# Patient Record
Sex: FEMALE | Race: WHITE | NOT HISPANIC OR LATINO | Employment: FULL TIME | URBAN - METROPOLITAN AREA
[De-identification: names, ages, dates, MRNs, and addresses within clinical notes are randomized per-mention and may not be internally consistent; named-entity substitution may affect disease eponyms.]

---

## 2018-06-04 ENCOUNTER — OFFICE VISIT (OUTPATIENT)
Dept: HEMATOLOGY ONCOLOGY | Facility: MEDICAL CENTER | Age: 38
End: 2018-06-04
Payer: MEDICAID

## 2018-06-04 VITALS
HEART RATE: 79 BPM | HEIGHT: 72 IN | TEMPERATURE: 97.3 F | SYSTOLIC BLOOD PRESSURE: 118 MMHG | RESPIRATION RATE: 18 BRPM | DIASTOLIC BLOOD PRESSURE: 70 MMHG | OXYGEN SATURATION: 100 % | BODY MASS INDEX: 23.53 KG/M2 | WEIGHT: 173.72 LBS

## 2018-06-04 DIAGNOSIS — Z80.9 FAMILY HISTORY OF CANCER: ICD-10-CM

## 2018-06-04 PROCEDURE — 99242 OFF/OP CONSLTJ NEW/EST SF 20: CPT | Performed by: MEDICAL GENETICS

## 2018-06-04 RX ORDER — CYCLOBENZAPRINE HCL 10 MG
TABLET ORAL
COMMUNITY
Start: 2018-06-03 | End: 2019-06-27

## 2018-06-04 RX ORDER — SPIRONOLACTONE 50 MG/1
TABLET, FILM COATED ORAL
COMMUNITY
Start: 2018-06-02 | End: 2019-04-16

## 2018-06-04 RX ORDER — CLONAZEPAM 0.5 MG/1
TABLET ORAL
COMMUNITY
Start: 2018-05-23 | End: 2019-04-16

## 2018-06-04 ASSESSMENT — PAIN SCALES - GENERAL: PAINLEVEL: NO PAIN

## 2018-06-04 NOTE — PROGRESS NOTES
Vidya Davies is a 37 y.o. year old patient who was referred for cancer genetic risk assessment     Chief Complaint:   Chief Complaint   Patient presents with   • New Patient     Family History of cancer       HPI: The patient does not carry a diagnosis of cancer, but her mother was diagnosed with breast cancer at age 35, bladder cancer at age 50 and recent diagnosis of pancreatic cancer. A cousin also has a pancreatic cancer diagnosis   Review of personal and family histories suggested that a cancer genetic risk assessment was in order.    Review of Systems (ROS):  Constitutional normal   Eyes normal   ENT normal    Respiratory normal   Cardiovascular normal   Gastrointestinal normal   Genitourinary normal   Musculoskeletal some joint pain recently  Skin normal   Neurological normal   Endocrine normal   Psychiatric normall  Hematologic/lymphatic normal   Allergic/Immunologic no sensitivities to medicateions  Remaining systems negative? Yes  Total review of symptoms  >10:       History (Past/Family/ROS)  Family History:    3 generation pedigree built  Yes   Moraima empiric risk calculation No   Adam II empiric risk calculation  No  Social History     Yes    Social History     Social History   • Marital status: Single     Spouse name: N/A   • Number of children: N/A   • Years of education: N/A     Occupational History   • Not on file.     Social History Main Topics   • Smoking status: Not on file   • Smokeless tobacco: Not on file   • Alcohol use Not on file   • Drug use: Unknown   • Sexual activity: Not on file     Other Topics Concern   • Not on file     Social History Narrative   • No narrative on file       Patient Past History     Yes  History areas assessef  3:   NCCN Testing Criteria Met                            No    Physical Exam  Constitutional    Encounter Vitals  Standard Vitals  Vitals  Blood Pressure: 118/70  Temperature: 36.3 °C (97.3 °F)  Pulse: 79  Respiration: 18  Pulse Oximetry: 100  %  Height: 182.9 cm (6')  Weight: 78.8 kg (173 lb 11.6 oz)  Encounter Vitals  Temperature: 36.3 °C (97.3 °F)  Temp Source: Temporal  Blood Pressure: 118/70  BP Location: Upper Arm, Right  Patient BP Position: Sitting  Pulse: 79  Respiration: 18  Pulse Oximetry: 100 %  Weight: 78.8 kg (173 lb 11.6 oz)  Weight Source: Stand Up Scale  Height: 182.9 cm (6')  BMI (Calculated): 23.56  Pain Score: No pain  Pregnant?: No  Breastfeeding Status: No  Pulmonary-Specific Vitals     Durable Medical Equipment-Specific Vitals              General appearance: normal   Head Circumference:   ([place])  Eyes    Conjunctiva: clear   Pupils: reactive to light and accomodation     ENMT    External inspection: nroaml   Assessment of Hearing:  Normal    Lips/cheeks/gums: no lesions  Neck   External exam: normal    Thyroid: no masses  Respiratory   Auscultation: clear in all fields      Cardiovascular   Auscultation: RRR with no  murmers   Edema : none  Chest   Breasts (exam): not done   Palpation:   GI   External exam and palpation: normal      Pelvic (female): not done   External exam (male):   Lymphatic   Palpation in 2 areas: normal     Musculoskeletal   Gait: normal    Digits and Nails:  No lesons  Skin   Inspection: normal    Palpation: no masses                  Fibrofolliculoma:No                  Trichodiscoma:No                   Akrochordon: No                   CAfe-au-lait: No                  Hypopigmentation: No                  Mucosal freckling No  Neurologic   Cranial nerves: intact   DTR’s: 1+ and equal  PE summary    18 bullets (of 25 total):     Problem Points   New, further evaluation planned (4)    Moderate  risk    DATA POINTS    >4:     PROBLEM POINTS  >4:     RISK  Intermediate    TYPE OF MEDICAL DECISION MAKING Intermediate      30 minutes TIME (FACE TO FACE) AND DOCUMENTED  DOCUMENTATION DESCRIBE CONTENTS OF COUNSELING/CARE COORDINATION  DOCUMENTATION SUPPORT >50% TIME SPENT IN COUNSELING/COORDINATION         20059(30 minutes)  with Moderate complexity    Patient with family (maternal ) of early breast cancer, bladder cancer and pancreatic cancer.  The patient's mother was just panel tested and results are pending.  Will await maternal results before initiating testing on this patient    30 minutes (10:10-10:40)

## 2019-04-02 ENCOUNTER — APPOINTMENT (OUTPATIENT)
Dept: RADIOLOGY | Facility: MEDICAL CENTER | Age: 39
End: 2019-04-02
Payer: MEDICAID

## 2019-04-02 ENCOUNTER — HOSPITAL ENCOUNTER (EMERGENCY)
Facility: MEDICAL CENTER | Age: 39
End: 2019-04-02
Attending: EMERGENCY MEDICINE
Payer: MEDICAID

## 2019-04-02 ENCOUNTER — APPOINTMENT (OUTPATIENT)
Dept: RADIOLOGY | Facility: MEDICAL CENTER | Age: 39
End: 2019-04-02
Attending: EMERGENCY MEDICINE
Payer: MEDICAID

## 2019-04-02 VITALS
HEART RATE: 67 BPM | RESPIRATION RATE: 29 BRPM | WEIGHT: 180.56 LBS | HEIGHT: 72 IN | SYSTOLIC BLOOD PRESSURE: 120 MMHG | TEMPERATURE: 98.3 F | DIASTOLIC BLOOD PRESSURE: 80 MMHG | BODY MASS INDEX: 24.46 KG/M2 | OXYGEN SATURATION: 98 %

## 2019-04-02 DIAGNOSIS — E05.90 HYPERTHYROIDISM: ICD-10-CM

## 2019-04-02 DIAGNOSIS — I49.3 VENTRICULAR ECTOPY: ICD-10-CM

## 2019-04-02 DIAGNOSIS — R07.89 ATYPICAL CHEST PAIN: ICD-10-CM

## 2019-04-02 LAB
ALBUMIN SERPL BCP-MCNC: 4.4 G/DL (ref 3.2–4.9)
ALBUMIN/GLOB SERPL: 1.9 G/DL
ALP SERPL-CCNC: 50 U/L (ref 30–99)
ALT SERPL-CCNC: 19 U/L (ref 2–50)
ANION GAP SERPL CALC-SCNC: 7 MMOL/L (ref 0–11.9)
AST SERPL-CCNC: 16 U/L (ref 12–45)
BASOPHILS # BLD AUTO: 0.5 % (ref 0–1.8)
BASOPHILS # BLD: 0.03 K/UL (ref 0–0.12)
BILIRUB SERPL-MCNC: 0.4 MG/DL (ref 0.1–1.5)
BNP SERPL-MCNC: 25 PG/ML (ref 0–100)
BUN SERPL-MCNC: 10 MG/DL (ref 8–22)
CALCIUM SERPL-MCNC: 9.4 MG/DL (ref 8.5–10.5)
CHLORIDE SERPL-SCNC: 107 MMOL/L (ref 96–112)
CO2 SERPL-SCNC: 26 MMOL/L (ref 20–33)
CREAT SERPL-MCNC: 0.7 MG/DL (ref 0.5–1.4)
EKG IMPRESSION: NORMAL
EOSINOPHIL # BLD AUTO: 0.04 K/UL (ref 0–0.51)
EOSINOPHIL NFR BLD: 0.7 % (ref 0–6.9)
ERYTHROCYTE [DISTWIDTH] IN BLOOD BY AUTOMATED COUNT: 41.7 FL (ref 35.9–50)
GLOBULIN SER CALC-MCNC: 2.3 G/DL (ref 1.9–3.5)
GLUCOSE SERPL-MCNC: 100 MG/DL (ref 65–99)
HCT VFR BLD AUTO: 42.7 % (ref 37–47)
HGB BLD-MCNC: 14.6 G/DL (ref 12–16)
IMM GRANULOCYTES # BLD AUTO: 0.01 K/UL (ref 0–0.11)
IMM GRANULOCYTES NFR BLD AUTO: 0.2 % (ref 0–0.9)
LYMPHOCYTES # BLD AUTO: 1.65 K/UL (ref 1–4.8)
LYMPHOCYTES NFR BLD: 27.3 % (ref 22–41)
MCH RBC QN AUTO: 31.5 PG (ref 27–33)
MCHC RBC AUTO-ENTMCNC: 34.2 G/DL (ref 33.6–35)
MCV RBC AUTO: 92.2 FL (ref 81.4–97.8)
MONOCYTES # BLD AUTO: 0.44 K/UL (ref 0–0.85)
MONOCYTES NFR BLD AUTO: 7.3 % (ref 0–13.4)
NEUTROPHILS # BLD AUTO: 3.87 K/UL (ref 2–7.15)
NEUTROPHILS NFR BLD: 64 % (ref 44–72)
NRBC # BLD AUTO: 0 K/UL
NRBC BLD-RTO: 0 /100 WBC
PLATELET # BLD AUTO: 210 K/UL (ref 164–446)
PMV BLD AUTO: 9.6 FL (ref 9–12.9)
POTASSIUM SERPL-SCNC: 3.5 MMOL/L (ref 3.6–5.5)
PROT SERPL-MCNC: 6.7 G/DL (ref 6–8.2)
RBC # BLD AUTO: 4.63 M/UL (ref 4.2–5.4)
SODIUM SERPL-SCNC: 140 MMOL/L (ref 135–145)
T4 FREE SERPL-MCNC: 1.6 NG/DL (ref 0.53–1.43)
TROPONIN I SERPL-MCNC: <0.01 NG/ML (ref 0–0.04)
TSH SERPL DL<=0.005 MIU/L-ACNC: 0.01 UIU/ML (ref 0.38–5.33)
WBC # BLD AUTO: 6 K/UL (ref 4.8–10.8)

## 2019-04-02 PROCEDURE — 93005 ELECTROCARDIOGRAM TRACING: CPT | Performed by: EMERGENCY MEDICINE

## 2019-04-02 PROCEDURE — 83880 ASSAY OF NATRIURETIC PEPTIDE: CPT

## 2019-04-02 PROCEDURE — 80053 COMPREHEN METABOLIC PANEL: CPT

## 2019-04-02 PROCEDURE — 85025 COMPLETE CBC W/AUTO DIFF WBC: CPT

## 2019-04-02 PROCEDURE — 84443 ASSAY THYROID STIM HORMONE: CPT

## 2019-04-02 PROCEDURE — 36415 COLL VENOUS BLD VENIPUNCTURE: CPT

## 2019-04-02 PROCEDURE — 93005 ELECTROCARDIOGRAM TRACING: CPT

## 2019-04-02 PROCEDURE — 84484 ASSAY OF TROPONIN QUANT: CPT

## 2019-04-02 PROCEDURE — 84439 ASSAY OF FREE THYROXINE: CPT

## 2019-04-02 PROCEDURE — 71045 X-RAY EXAM CHEST 1 VIEW: CPT

## 2019-04-02 PROCEDURE — 99285 EMERGENCY DEPT VISIT HI MDM: CPT

## 2019-04-02 RX ORDER — PROPRANOLOL HYDROCHLORIDE 20 MG/1
20 TABLET ORAL 3 TIMES DAILY
Qty: 30 TAB | Refills: 0 | Status: SHIPPED | OUTPATIENT
Start: 2019-04-02 | End: 2019-04-16 | Stop reason: SDUPTHER

## 2019-04-02 RX ORDER — PROPRANOLOL HYDROCHLORIDE 10 MG/1
10 TABLET ORAL 3 TIMES DAILY
Qty: 30 TAB | Refills: 0 | Status: SHIPPED | OUTPATIENT
Start: 2019-04-02 | End: 2019-04-02

## 2019-04-02 NOTE — ED PROVIDER NOTES
ED Provider Note    CHIEF COMPLAINT  Chief Complaint   Patient presents with   • Chest Pain   • Sent from Urgent Care       HPI  Vidya Davies is a 38 y.o. female who presents for evaluation of chest pain and tachycardia.  Patient states that since February she has been having intermittent episodes of chest pain.  She describes transient sharp pains in the chest area lasting 1-2 seconds at most.  She states this is gone on and off for the last couple of months.  She related this to starting prednisone for back pain with radiculopathy.  Last night, the patient states that her apple watch indicated that her heart was beating fast with a heart rate of 130.  The patient was seen at the urgent care and sent to the ED for evaluation.  The patient denies: Fever, nasal congestion, purulent rhinorrhea, cough, sputum, hemoptysis, vomiting, hematemesis, melena hematochezia, pain or swelling in lower extremities, unilateral motor weakness or paresthesias, syncope.  She has had some lightheadedness.  The patient indicates she has been under quite a bit of stress over the last year as her mother  last  of pancreatic cancer.  She states her brother, who is her , is an alcoholic and has not been out of bed for 2 months to be able to sister in taking care of the restaurant.  No other acute symptomatology or complaints.    REVIEW OF SYSTEMS  See HPI for further details.  She denies a history of: Hypertension, diabetes, thyroid dysfunction, seizures, dyslipidemia, cardiopulmonary disorders, gastrointestinal disorders, genitourinary disorders.  All other systems negative.    PAST MEDICAL HISTORY  1.  Anxiety    FAMILY HISTORY  No family history on file.    SOCIAL HISTORY  Non-smoker;    SURGICAL HISTORY  No past surgical history on file.    CURRENT MEDICATIONS  See nurse's notes    ALLERGIES  No Known Allergies    PHYSICAL EXAM  VITAL SIGNS: /80   Pulse 93   Temp 36.8 °C (98.3 °F) (Temporal)    "Resp 16   Ht 1.905 m (6' 3\")   Wt 81.9 kg (180 lb 8.9 oz)   SpO2 100%   BMI 22.57 kg/m²    Constitutional: 38-year-old female, well developed, Well nourished, No acute distress, Non-toxic appearance.   HENT: ,Atraumatic, Bilateral external ears normal, tympanic membranes clear, Oropharynx moist, No oral exudates, Nose normal.   Eyes: PERRL, EOMI, Conjunctiva normal, No discharge.   Neck: Normal range of motion, No tenderness, Supple, No stridor.  No appreciable thyromegaly;  Lymphatic: No lymphadenopathy noted.   Cardiovascular: Normal heart rate, Normal rhythm, No murmurs, No rubs, No gallops.   Thorax & Lungs: Normal Equal breath sounds, No respiratory distress, No wheezing, no stridor, no rales. No chest tenderness.   Abdomen: Soft, nontender, nondistended, no organomegaly, positive bowel sounds normal in quality. No guarding or rebound.  Skin: Good skin turgor, pink, warm, dry. No rashes, petechiae, purpura. Normal capillary refill.   Back: No tenderness, No CVA tenderness.   Extremities: Intact distal pulses, No edema, No tenderness, No cyanosis, No clubbing. Vascular: Pulses are 2+, symmetric in the upper and lower extremities.  Musculoskeletal: Good range of motion in all major joints. No tenderness to palpation or major deformities noted.   Neurologic: Alert & oriented x 3, Normal motor function, Normal sensory function, No gross focal deficits noted.   Psychiatric: Affect normal, Judgment normal, Mood normal.     EKG  I have interpreted: Rate 95, rhythm sinus, axis normal, occasional PVC, twelve-lead EKG, no old tracing for comparison;    RADIOLOGY/PROCEDURES  DX-CHEST-PORTABLE (1 VIEW)   Final Result      No acute cardiac or pulmonary abnormality is noted.            COURSE & MEDICAL DECISION MAKING  Pertinent Labs & Imaging studies reviewed. (See chart for details)  1.  Saline lock  2.  Monitor    Laboratory studies: CBC and differential within normal; CMP shows potassium 3.5 otherwise within normal; " troponin less than 0.01; BNP 25; TSH low at 0.010; T4 elevated at 1.60;    Discussion: At this time, patient presents for evaluation of palpitations.  Patient has ventricular ectopy but no evidence of couplets or ventricular tachycardia.  There is no syncope or near syncope associated with these findings.  The workup did reveal evidence of hyperthyroidism which definitely may be causing the ventricular ectopy.  The patient will be placed on low-dose propranolol and given referral back to primary care for consideration of endocrinology evaluation and treatment of hypothyroidism.  In addition, the patient was given follow-up with cardiology for evaluation and consideration of ventricular ectopy and possible Holter monitoring to ensure no serious dysrhythmias.  Based on the findings, the patient is stable for discharge.  I discussed the findings treatment plan with the patient.  She indicates she is comfortable with this explanation disposition.    FINAL IMPRESSION  1. Ventricular ectopy    2. Hyperthyroidism    3. Atypical chest pain           PLAN  1.  Follow-up with primary care  2.  Propranolol 20 mg 3 times daily  3.  Follow-up with primary care  4.  Follow-up with endocrinology  5.  Follow-up with cardiology    Electronically signed by: Guy G Gansert, 4/2/2019 11:55 AM

## 2019-04-02 NOTE — ED TRIAGE NOTES
Pt c/o rapid HR. Pt seen at urgent care and told to come to ED. Pt wears apple watch and had  and 130 at night.

## 2019-04-02 NOTE — ED NOTES
Patient given d/c instructions and prescription, verbalized understanding. AAOx4, VSS, d/c'd home.

## 2019-04-16 ENCOUNTER — OFFICE VISIT (OUTPATIENT)
Dept: CARDIOLOGY | Facility: MEDICAL CENTER | Age: 39
End: 2019-04-16
Payer: MEDICAID

## 2019-04-16 VITALS
HEART RATE: 88 BPM | HEIGHT: 72 IN | BODY MASS INDEX: 24.38 KG/M2 | OXYGEN SATURATION: 100 % | SYSTOLIC BLOOD PRESSURE: 130 MMHG | DIASTOLIC BLOOD PRESSURE: 70 MMHG | WEIGHT: 180 LBS

## 2019-04-16 DIAGNOSIS — E05.90 HYPERTHYROIDISM: Chronic | ICD-10-CM

## 2019-04-16 DIAGNOSIS — I49.3 MULTIFOCAL PVCS: Chronic | ICD-10-CM

## 2019-04-16 PROCEDURE — 99204 OFFICE O/P NEW MOD 45 MIN: CPT | Performed by: INTERNAL MEDICINE

## 2019-04-16 RX ORDER — BUSPIRONE HYDROCHLORIDE 15 MG/1
TABLET ORAL
Refills: 5 | COMMUNITY
Start: 2019-03-19 | End: 2019-06-27

## 2019-04-16 RX ORDER — HYDROCODONE BITARTRATE AND ACETAMINOPHEN 10; 325 MG/1; MG/1
TABLET ORAL
Refills: 0 | COMMUNITY
Start: 2019-02-15 | End: 2019-04-16

## 2019-04-16 RX ORDER — PROPRANOLOL HYDROCHLORIDE 10 MG/1
TABLET ORAL
Refills: 0 | COMMUNITY
Start: 2019-04-02 | End: 2019-04-16

## 2019-04-16 RX ORDER — PROPRANOLOL HYDROCHLORIDE 20 MG/1
20 TABLET ORAL 3 TIMES DAILY
Qty: 90 TAB | Refills: 3 | Status: SHIPPED | OUTPATIENT
Start: 2019-04-16 | End: 2019-08-05 | Stop reason: SDUPTHER

## 2019-04-16 RX ORDER — CLONAZEPAM 1 MG/1
TABLET ORAL
Refills: 2 | COMMUNITY
Start: 2019-04-03 | End: 2019-06-27

## 2019-04-16 RX ORDER — CETIRIZINE HYDROCHLORIDE 10 MG/1
TABLET ORAL
Refills: 9 | COMMUNITY
Start: 2019-04-01 | End: 2019-06-27

## 2019-04-16 RX ORDER — DOXEPIN HYDROCHLORIDE 10 MG/1
CAPSULE ORAL
Refills: 3 | COMMUNITY
Start: 2019-03-22 | End: 2019-06-27

## 2019-04-16 RX ORDER — PREDNISONE 10 MG/1
TABLET ORAL
Refills: 0 | COMMUNITY
Start: 2019-02-21 | End: 2019-04-16

## 2019-04-16 RX ORDER — BENZONATATE 100 MG/1
CAPSULE ORAL
Refills: 0 | COMMUNITY
Start: 2019-03-12 | End: 2019-04-16

## 2019-04-16 ASSESSMENT — ENCOUNTER SYMPTOMS
EYE REDNESS: 1
BACK PAIN: 1
MEMORY LOSS: 1
PALPITATIONS: 1
INSOMNIA: 1
HEADACHES: 1
CHILLS: 1
NECK PAIN: 1
DIZZINESS: 1
NERVOUS/ANXIOUS: 1

## 2019-04-16 NOTE — LETTER
[facility]   [address], [address]  [place] [address]  Phone: [phone]  Fax: [phone]              Vidya Davies  1980    Encounter Date: 4/16/2019    Kenny Moore M.D.          PROGRESS NOTE:  Chief Complaint   Patient presents with   • Chest Pain     hospital follow up       Subjective:   Vidya Davies is a 38 y.o. female who presents today for a recent ER visit for palpitations where she found to have multifocal PVCs and concern for hyperthyroidism.  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     Social History Main Topics   • Smoking status: Never Smoker   • Smokeless tobacco: Never Used   • Alcohol use No   • Drug use: No   • Sexual activity: Not on file     Other Topics Concern   • Not on file     Social History Narrative   • No narrative on file     No Known Allergies  Outpatient Encounter Prescriptions as of 4/16/2019   Medication Sig Dispense Refill   • busPIRone (BUSPAR) 15 MG tablet TK 1 T PO BID  5   • cetirizine (ZYRTEC) 10 MG Tab TK 1 T PO QD  9   • clonazePAM (KLONOPIN) 1 MG Tab TK 1 T PO QD  2   • doxepin (SINEQUAN) 10 MG Cap TK 1 C PO QHS  3   • propranolol (INDERAL) 20 MG Tab Take 1 Tab by mouth 3 times a day. 90 Tab 3   • cyclobenzaprine (FLEXERIL) 10 MG Tab      • [DISCONTINUED] benzonatate (TESSALON) 100 MG  Cap TK 1 C PO TID PRF COUGH  0   • [DISCONTINUED] HYDROcodone/acetaminophen (NORCO)  MG Tab TK 1 T PO Q 4 TO 6 H PRN P  0   • [DISCONTINUED] predniSONE (DELTASONE) 10 MG Tab TK 1 T PO QD  0   • [DISCONTINUED] propranolol (INDERAL) 10 MG Tab TK 1 T PO TID  0   • [DISCONTINUED] propranolol (INDERAL) 20 MG Tab Take 1 Tab by mouth 3 times a day. (Patient not taking: Reported on 4/16/2019) 30 Tab 0   • [DISCONTINUED] spironolactone (ALDACTONE) 50 MG Tab      • [DISCONTINUED] clonazePAM (KLONOPIN) 0.5 MG Tab      • [DISCONTINUED] alprazolam (XANAX) 0.25 MG TABS Take 0.25 mg by mouth at bedtime as needed for Sleep.     • [DISCONTINUED] busPIRone (BUSPAR) 5 MG TABS Take 5 mg by mouth 3 times a day.     • [DISCONTINUED] hydrocodone-acetaminophen (NORCO) 5-325 MG TABS per tablet Take 1-2 Tabs by mouth every four hours as needed. (Patient not taking: Reported on 4/16/2019) 15 Tab 0   • [DISCONTINUED] ibuprofen (MOTRIN) 800 MG TABS Take 1 Tab by mouth every 8 hours as needed. (Patient not taking: Reported on 4/16/2019) 30 Tab 0     No facility-administered encounter medications on file as of 4/16/2019.      Review of Systems   Constitutional: Positive for chills and malaise/fatigue.   Eyes: Positive for redness.   Cardiovascular: Positive for chest pain and palpitations.   Musculoskeletal: Positive for back pain, joint pain and neck pain.   Neurological: Positive for dizziness and headaches.   Endo/Heme/Allergies: Positive for environmental allergies.   Psychiatric/Behavioral: Positive for memory loss. The patient is nervous/anxious and has insomnia.         Objective:   /70 (BP Location: Left arm, Patient Position: Sitting)   Pulse 88   Ht 1.829 m (6')   Wt 81.6 kg (180 lb)   SpO2 100%   BMI 24.41 kg/m²      Physical Exam   Constitutional: No distress.   HENT:   Mouth/Throat: Oropharynx is clear and moist. No oropharyngeal exudate.   Eyes: No scleral icterus.   Neck: No JVD present.   Cardiovascular: Normal  rate and normal heart sounds.  Exam reveals no gallop and no friction rub.    No murmur heard.  Pulmonary/Chest: No respiratory distress. She has no wheezes. She has no rales.   Abdominal: Soft. Bowel sounds are normal.   Musculoskeletal: She exhibits no edema.   Neurological: She is alert.   Skin: No rash noted. She is not diaphoretic.   Psychiatric: She has a normal mood and affect.     We reviewed in person the recent labs  Recent Results (from the past 4032 hour(s))   EKG (NOW)    Collection Time: 19 10:26 AM   Result Value Ref Range    Report       [facility]    Test Date:  2019  Pt Name:    PENNY EPPERSON            Department: ER  MRN:        [id number]                      Room:  Gender:     Female                       Technician: [id number]  :        1980                   Requested By:ER TRIAGE PROTOCOL  Order #:    [id number]                    Reading MD:    Measurements  Intervals                                Axis  Rate:       93                           P:          74  NE:         164                          QRS:        67  QRSD:       100                          T:          -2  QT:         380  QTc:        473    Interpretive Statements  SINUS TACHYCARDIA  MULTIFORM VENTRICULAR PREMATURE COMPLEXES  LOW VOLTAGE IN FRONTAL LEADS  BORDERLINE T ABNORMALITIES, INFERIOR LEADS  No previous ECG available for comparison     CBC with Differential    Collection Time: 19 12:15 PM   Result Value Ref Range    WBC 6.0 4.8 - 10.8 K/uL    RBC 4.63 4.20 - 5.40 M/uL    Hemoglobin 14.6 12.0 - 16.0 g/dL    Hematocrit 42.7 37.0 - 47.0 %    MCV 92.2 81.4 - 97.8 fL    MCH 31.5 27.0 - 33.0 pg    MCHC 34.2 33.6 - 35.0 g/dL    RDW 41.7 35.9 - 50.0 fL    Platelet Count 210 164 - 446 K/uL    MPV 9.6 9.0 - 12.9 fL    Neutrophils-Polys 64.00 44.00 - 72.00 %    Lymphocytes 27.30 22.00 - 41.00 %    Monocytes 7.30 0.00 - 13.40 %    Eosinophils 0.70 0.00 - 6.90 %     Basophils 0.50 0.00 - 1.80 %    Immature Granulocytes 0.20 0.00 - 0.90 %    Nucleated RBC 0.00 /100 WBC    Neutrophils (Absolute) 3.87 2.00 - 7.15 K/uL    Lymphs (Absolute) 1.65 1.00 - 4.80 K/uL    Monos (Absolute) 0.44 0.00 - 0.85 K/uL    Eos (Absolute) 0.04 0.00 - 0.51 K/uL    Baso (Absolute) 0.03 0.00 - 0.12 K/uL    Immature Granulocytes (abs) 0.01 0.00 - 0.11 K/uL    NRBC (Absolute) 0.00 K/uL   Complete Metabolic Panel (CMP)    Collection Time: 04/02/19 12:15 PM   Result Value Ref Range    Sodium 140 135 - 145 mmol/L    Potassium 3.5 (L) 3.6 - 5.5 mmol/L    Chloride 107 96 - 112 mmol/L    Co2 26 20 - 33 mmol/L    Anion Gap 7.0 0.0 - 11.9    Glucose 100 (H) 65 - 99 mg/dL    Bun 10 8 - 22 mg/dL    Creatinine 0.70 0.50 - 1.40 mg/dL    Calcium 9.4 8.5 - 10.5 mg/dL    AST(SGOT) 16 12 - 45 U/L    ALT(SGPT) 19 2 - 50 U/L    Alkaline Phosphatase 50 30 - 99 U/L    Total Bilirubin 0.4 0.1 - 1.5 mg/dL    Albumin 4.4 3.2 - 4.9 g/dL    Total Protein 6.7 6.0 - 8.2 g/dL    Globulin 2.3 1.9 - 3.5 g/dL    A-G Ratio 1.9 g/dL   Troponin    Collection Time: 04/02/19 12:15 PM   Result Value Ref Range    Troponin I <0.01 0.00 - 0.04 ng/mL   TSH (for screening thyroid dysfunction)    Collection Time: 04/02/19 12:15 PM   Result Value Ref Range    TSH 0.010 (L) 0.380 - 5.330 uIU/mL   Free Thyroxine (add to TSH for patients with existing thyroid dysfunction)    Collection Time: 04/02/19 12:15 PM   Result Value Ref Range    Free T-4 1.60 (H) 0.53 - 1.43 ng/dL   BTYPE NATRIURETIC PEPTIDE    Collection Time: 04/02/19 12:15 PM   Result Value Ref Range    B Natriuretic Peptide 25 0 - 100 pg/mL   ESTIMATED GFR    Collection Time: 04/02/19 12:15 PM   Result Value Ref Range    GFR If African American >60 >60 mL/min/1.73 m 2    GFR If Non African American >60 >60 mL/min/1.73 m 2       Assessment:     1. Multifocal PVCs  Holter Monitor Study    EC-ECHOCARDIOGRAM COMPLETE W/O CONT    TSH    THYROXINE    FREE THYROXINE   2. Hyperthyroidism  TSH     THYROXINE    FREE THYROXINE       Medical Decision Making:  Today's Assessment / Status / Plan:     It was my pleasure to meet with Ms. Davies.    We will get a Holter monitor to understand her overall burden and echocardiogram to rule out structural heart disease and reevaluation of her thyroid studies    I will see Ms. Davies back in 6 months time and encouraged her to follow up with us over the phone or e-mail using my MyChart as issues arise.    It is my pleasure to participate in the care of Ms. Davies.  Please do not hesitate to contact me with questions or concerns.    Kenny Moore MD PhD [facility]  Cardiologist [facility]    Please note that this dictation was created using voice recognition software. I have worked with consultants from the vendor as well as technical experts from [facility] to optimize the interface. I have made every reasonable attempt to correct obvious errors, but I expect that there are errors of grammar and possibly content I did not discover before finalizing the note.         Yeny Burrell M.D.  [address] [address]  VIA Facsimile: [phone]

## 2019-04-17 NOTE — PROGRESS NOTES
Chief Complaint   Patient presents with   • Chest Pain     hospital follow up       Subjective:   Vidya Davies is a 38 y.o. female who presents today for a recent ER visit for palpitations where she found to have multifocal PVCs and concern for hyperthyroidism.  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     Social History Main Topics   • Smoking status: Never Smoker   • Smokeless tobacco: Never Used   • Alcohol use No   • Drug use: No   • Sexual activity: Not on file     Other Topics Concern   • Not on file     Social History Narrative   • No narrative on file     No Known Allergies  Outpatient Encounter Prescriptions as of 4/16/2019   Medication Sig Dispense Refill   • busPIRone (BUSPAR) 15 MG tablet TK 1 T PO BID  5   • cetirizine (ZYRTEC) 10 MG Tab TK 1 T PO QD  9   • clonazePAM (KLONOPIN) 1 MG Tab TK 1 T PO QD  2   • doxepin (SINEQUAN) 10 MG Cap TK 1 C PO QHS  3   • propranolol (INDERAL) 20 MG Tab Take 1 Tab by mouth 3 times a day. 90 Tab 3   • cyclobenzaprine (FLEXERIL) 10 MG Tab      • [DISCONTINUED] benzonatate (TESSALON) 100 MG Cap TK 1 C PO TID PRF COUGH  0   • [DISCONTINUED] HYDROcodone/acetaminophen (NORCO)  MG Tab TK 1 T PO Q 4 TO 6 H PRN P  0   • [DISCONTINUED] predniSONE (DELTASONE) 10 MG Tab TK 1 T PO QD  0   • [DISCONTINUED] propranolol (INDERAL) 10 MG Tab TK 1 T PO TID  0   •  [DISCONTINUED] propranolol (INDERAL) 20 MG Tab Take 1 Tab by mouth 3 times a day. (Patient not taking: Reported on 4/16/2019) 30 Tab 0   • [DISCONTINUED] spironolactone (ALDACTONE) 50 MG Tab      • [DISCONTINUED] clonazePAM (KLONOPIN) 0.5 MG Tab      • [DISCONTINUED] alprazolam (XANAX) 0.25 MG TABS Take 0.25 mg by mouth at bedtime as needed for Sleep.     • [DISCONTINUED] busPIRone (BUSPAR) 5 MG TABS Take 5 mg by mouth 3 times a day.     • [DISCONTINUED] hydrocodone-acetaminophen (NORCO) 5-325 MG TABS per tablet Take 1-2 Tabs by mouth every four hours as needed. (Patient not taking: Reported on 4/16/2019) 15 Tab 0   • [DISCONTINUED] ibuprofen (MOTRIN) 800 MG TABS Take 1 Tab by mouth every 8 hours as needed. (Patient not taking: Reported on 4/16/2019) 30 Tab 0     No facility-administered encounter medications on file as of 4/16/2019.      Review of Systems   Constitutional: Positive for chills and malaise/fatigue.   Eyes: Positive for redness.   Cardiovascular: Positive for chest pain and palpitations.   Musculoskeletal: Positive for back pain, joint pain and neck pain.   Neurological: Positive for dizziness and headaches.   Endo/Heme/Allergies: Positive for environmental allergies.   Psychiatric/Behavioral: Positive for memory loss. The patient is nervous/anxious and has insomnia.         Objective:   /70 (BP Location: Left arm, Patient Position: Sitting)   Pulse 88   Ht 1.829 m (6')   Wt 81.6 kg (180 lb)   SpO2 100%   BMI 24.41 kg/m²     Physical Exam   Constitutional: No distress.   HENT:   Mouth/Throat: Oropharynx is clear and moist. No oropharyngeal exudate.   Eyes: No scleral icterus.   Neck: No JVD present.   Cardiovascular: Normal rate and normal heart sounds.  Exam reveals no gallop and no friction rub.    No murmur heard.  Pulmonary/Chest: No respiratory distress. She has no wheezes. She has no rales.   Abdominal: Soft. Bowel sounds are normal.   Musculoskeletal: She exhibits no edema.    Neurological: She is alert.   Skin: No rash noted. She is not diaphoretic.   Psychiatric: She has a normal mood and affect.     We reviewed in person the recent labs  Recent Results (from the past 4032 hour(s))   EKG (NOW)    Collection Time: 19 10:26 AM   Result Value Ref Range    Report       [facility] Emergency Dept.    Test Date:  2019  Pt Name:    PENNY EPPERSON            Department: ER  MRN:        [id number]                      Room:  Gender:     Female                       Technician: [id number]  :        1980                   Requested By:ER TRIAGE PROTOCOL  Order #:    [id number]                    Reading MD:    Measurements  Intervals                                Axis  Rate:       93                           P:          74  AR:         164                          QRS:        67  QRSD:       100                          T:          -2  QT:         380  QTc:        473    Interpretive Statements  SINUS TACHYCARDIA  MULTIFORM VENTRICULAR PREMATURE COMPLEXES  LOW VOLTAGE IN FRONTAL LEADS  BORDERLINE T ABNORMALITIES, INFERIOR LEADS  No previous ECG available for comparison     CBC with Differential    Collection Time: 19 12:15 PM   Result Value Ref Range    WBC 6.0 4.8 - 10.8 K/uL    RBC 4.63 4.20 - 5.40 M/uL    Hemoglobin 14.6 12.0 - 16.0 g/dL    Hematocrit 42.7 37.0 - 47.0 %    MCV 92.2 81.4 - 97.8 fL    MCH 31.5 27.0 - 33.0 pg    MCHC 34.2 33.6 - 35.0 g/dL    RDW 41.7 35.9 - 50.0 fL    Platelet Count 210 164 - 446 K/uL    MPV 9.6 9.0 - 12.9 fL    Neutrophils-Polys 64.00 44.00 - 72.00 %    Lymphocytes 27.30 22.00 - 41.00 %    Monocytes 7.30 0.00 - 13.40 %    Eosinophils 0.70 0.00 - 6.90 %    Basophils 0.50 0.00 - 1.80 %    Immature Granulocytes 0.20 0.00 - 0.90 %    Nucleated RBC 0.00 /100 WBC    Neutrophils (Absolute) 3.87 2.00 - 7.15 K/uL    Lymphs (Absolute) 1.65 1.00 - 4.80 K/uL    Monos (Absolute) 0.44 0.00 - 0.85 K/uL    Eos (Absolute) 0.04 0.00 -  0.51 K/uL    Baso (Absolute) 0.03 0.00 - 0.12 K/uL    Immature Granulocytes (abs) 0.01 0.00 - 0.11 K/uL    NRBC (Absolute) 0.00 K/uL   Complete Metabolic Panel (CMP)    Collection Time: 04/02/19 12:15 PM   Result Value Ref Range    Sodium 140 135 - 145 mmol/L    Potassium 3.5 (L) 3.6 - 5.5 mmol/L    Chloride 107 96 - 112 mmol/L    Co2 26 20 - 33 mmol/L    Anion Gap 7.0 0.0 - 11.9    Glucose 100 (H) 65 - 99 mg/dL    Bun 10 8 - 22 mg/dL    Creatinine 0.70 0.50 - 1.40 mg/dL    Calcium 9.4 8.5 - 10.5 mg/dL    AST(SGOT) 16 12 - 45 U/L    ALT(SGPT) 19 2 - 50 U/L    Alkaline Phosphatase 50 30 - 99 U/L    Total Bilirubin 0.4 0.1 - 1.5 mg/dL    Albumin 4.4 3.2 - 4.9 g/dL    Total Protein 6.7 6.0 - 8.2 g/dL    Globulin 2.3 1.9 - 3.5 g/dL    A-G Ratio 1.9 g/dL   Troponin    Collection Time: 04/02/19 12:15 PM   Result Value Ref Range    Troponin I <0.01 0.00 - 0.04 ng/mL   TSH (for screening thyroid dysfunction)    Collection Time: 04/02/19 12:15 PM   Result Value Ref Range    TSH 0.010 (L) 0.380 - 5.330 uIU/mL   Free Thyroxine (add to TSH for patients with existing thyroid dysfunction)    Collection Time: 04/02/19 12:15 PM   Result Value Ref Range    Free T-4 1.60 (H) 0.53 - 1.43 ng/dL   BTYPE NATRIURETIC PEPTIDE    Collection Time: 04/02/19 12:15 PM   Result Value Ref Range    B Natriuretic Peptide 25 0 - 100 pg/mL   ESTIMATED GFR    Collection Time: 04/02/19 12:15 PM   Result Value Ref Range    GFR If African American >60 >60 mL/min/1.73 m 2    GFR If Non African American >60 >60 mL/min/1.73 m 2       Assessment:     1. Multifocal PVCs  Holter Monitor Study    EC-ECHOCARDIOGRAM COMPLETE W/O CONT    TSH    THYROXINE    FREE THYROXINE   2. Hyperthyroidism  TSH    THYROXINE    FREE THYROXINE       Medical Decision Making:  Today's Assessment / Status / Plan:     It was my pleasure to meet with Ms. Davies.    We will get a Holter monitor to understand her overall burden and echocardiogram to rule out structural heart disease  and reevaluation of her thyroid studies    I will see Ms. Davies back in 6 months time and encouraged her to follow up with us over the phone or e-mail using my MyChart as issues arise.    It is my pleasure to participate in the care of Ms. Davies.  Please do not hesitate to contact me with questions or concerns.    Kenny Moore MD PhD [facility]  Cardiologist [facility]    Please note that this dictation was created using voice recognition software. I have worked with consultants from the vendor as well as technical experts from [facility] to optimize the interface. I have made every reasonable attempt to correct obvious errors, but I expect that there are errors of grammar and possibly content I did not discover before finalizing the note.

## 2019-04-26 ENCOUNTER — HOSPITAL ENCOUNTER (OUTPATIENT)
Dept: LAB | Facility: MEDICAL CENTER | Age: 39
End: 2019-04-26
Attending: INTERNAL MEDICINE
Payer: MEDICAID

## 2019-04-26 DIAGNOSIS — I49.3 MULTIFOCAL PVCS: Chronic | ICD-10-CM

## 2019-04-26 DIAGNOSIS — E05.90 HYPERTHYROIDISM: Chronic | ICD-10-CM

## 2019-05-03 ENCOUNTER — NON-PROVIDER VISIT (OUTPATIENT)
Dept: CARDIOLOGY | Facility: MEDICAL CENTER | Age: 39
End: 2019-05-03
Payer: MEDICAID

## 2019-05-03 DIAGNOSIS — I49.3 MULTIFOCAL PVCS: Chronic | ICD-10-CM

## 2019-05-06 ENCOUNTER — HOSPITAL ENCOUNTER (OUTPATIENT)
Dept: CARDIOLOGY | Facility: MEDICAL CENTER | Age: 39
End: 2019-05-06
Attending: INTERNAL MEDICINE
Payer: MEDICAID

## 2019-05-06 ENCOUNTER — TELEPHONE (OUTPATIENT)
Dept: CARDIOLOGY | Facility: MEDICAL CENTER | Age: 39
End: 2019-05-06

## 2019-05-06 DIAGNOSIS — I49.3 MULTIFOCAL PVCS: Chronic | ICD-10-CM

## 2019-05-06 LAB
EKG IMPRESSION: NORMAL
LV EJECT FRACT  99904: 60
LV EJECT FRACT MOD 4C 99902: 56.43

## 2019-05-06 PROCEDURE — 93306 TTE W/DOPPLER COMPLETE: CPT

## 2019-05-06 PROCEDURE — 93224 XTRNL ECG REC UP TO 48 HRS: CPT | Performed by: INTERNAL MEDICINE

## 2019-05-06 PROCEDURE — 93306 TTE W/DOPPLER COMPLETE: CPT | Mod: 26 | Performed by: INTERNAL MEDICINE

## 2019-05-07 ENCOUNTER — TELEPHONE (OUTPATIENT)
Dept: CARDIOLOGY | Facility: MEDICAL CENTER | Age: 39
End: 2019-05-07

## 2019-05-07 NOTE — TELEPHONE ENCOUNTER
Returned patient call and reviewed MD recommendations she verbalizes understanding.  Reviewed MD recommendations on next FV and offered patient to be transferred to schedulers.  She accepts.  She states no other concerns or questions at this time and is appreciative of information given.  Call successfully transferred to schedulers.    ----------------------  Holter Monitor Study   Order: [id number]   Status:  Final result   Visible to patient:  Yes (Portia)  Dx:  Multifocal PVCs   Notes recorded by Kenny Moore M.D. on 5/6/2019 at 7:43 PM PDT    Her monitor does show some arrhythmias nothing alarming but she may know this episode of fast heart rhythms in a row certainly let us know if she has any presyncope or syncope and she would benefit for yearly follow-up at least    Thank you     EC-ECHOCARDIOGRAM COMPLETE W/O CONT   Order: [id number]   Status:  Final result   Visible to patient:  Yes (Portia)  Dx:  Multifocal PVCs   Notes recorded by Kenny Moore M.D. on 5/6/2019 at 7:36 PM PDT    The echo looks good, she does have borderline prolapse but no significant issues will need long-term follow-up perhaps every 5 years, please let her know     Thank you

## 2019-05-07 NOTE — TELEPHONE ENCOUNTER
TSH   Order: [id number]   Status:  Edited Result - FINAL   Visible to patient:  Yes (MyChart)   Notes recorded by Kenny Moore M.D. on 4/29/2019 at 2:44 PM PDT    This is quite high needs to see PMD or endo ASAP    Thank you     Returned patient call and reviewed MD recommendations.  She verbalizes understanding and states she turned in the Holter Monitor this morning and completed the Echo today.  Reassurance given once results are reviewed by MD, recommendations will be made, and patient will be contacted.  She states no other concerns or questions at this time and is appreciative of information given.

## 2019-06-03 ENCOUNTER — OFFICE VISIT (OUTPATIENT)
Dept: INTERNAL MEDICINE | Facility: MEDICAL CENTER | Age: 39
End: 2019-06-03
Payer: MEDICAID

## 2019-06-03 ENCOUNTER — PATIENT MESSAGE (OUTPATIENT)
Dept: INTERNAL MEDICINE | Facility: MEDICAL CENTER | Age: 39
End: 2019-06-03

## 2019-06-03 VITALS
SYSTOLIC BLOOD PRESSURE: 128 MMHG | DIASTOLIC BLOOD PRESSURE: 84 MMHG | OXYGEN SATURATION: 94 % | HEART RATE: 73 BPM | BODY MASS INDEX: 24.76 KG/M2 | HEIGHT: 72 IN | WEIGHT: 182.8 LBS | TEMPERATURE: 98.4 F

## 2019-06-03 DIAGNOSIS — E05.90 HYPERTHYROIDISM: Chronic | ICD-10-CM

## 2019-06-03 DIAGNOSIS — R53.83 FATIGUE, UNSPECIFIED TYPE: ICD-10-CM

## 2019-06-03 DIAGNOSIS — F41.9 ANXIETY: ICD-10-CM

## 2019-06-03 DIAGNOSIS — E87.6 HYPOKALEMIA: ICD-10-CM

## 2019-06-03 DIAGNOSIS — I49.3 MULTIFOCAL PVCS: Chronic | ICD-10-CM

## 2019-06-03 DIAGNOSIS — Z00.00 HEALTHCARE MAINTENANCE: ICD-10-CM

## 2019-06-03 PROBLEM — E28.2 PCOS (POLYCYSTIC OVARIAN SYNDROME): Status: ACTIVE | Noted: 2019-06-03

## 2019-06-03 PROCEDURE — 99204 OFFICE O/P NEW MOD 45 MIN: CPT | Mod: GC | Performed by: INTERNAL MEDICINE

## 2019-06-03 RX ORDER — VALACYCLOVIR HYDROCHLORIDE 1 G/1
TABLET, FILM COATED ORAL
Refills: 1 | COMMUNITY
Start: 2019-04-29 | End: 2019-06-27

## 2019-06-03 RX ORDER — METHIMAZOLE 5 MG/1
5 TABLET ORAL 3 TIMES DAILY
COMMUNITY
End: 2019-06-04 | Stop reason: SDUPTHER

## 2019-06-03 RX ORDER — MULTIVITAMIN WITH IRON
TABLET ORAL
COMMUNITY
End: 2019-06-28

## 2019-06-03 RX ORDER — CHOLECALCIFEROL (VITAMIN D3) 125 MCG
500 CAPSULE ORAL DAILY
COMMUNITY
End: 2019-06-27

## 2019-06-03 RX ORDER — MULTIVIT WITH MINERALS/LUTEIN
TABLET ORAL
COMMUNITY
End: 2019-06-03

## 2019-06-03 ASSESSMENT — PATIENT HEALTH QUESTIONNAIRE - PHQ9: CLINICAL INTERPRETATION OF PHQ2 SCORE: 0

## 2019-06-03 NOTE — PROGRESS NOTES
New Patient to Establish    Reason to establish: New patient to establish    CC: establish care, hyperthyroidism, anxiety    HPI: 38F with PMHx recently diagnosed hyperthyroidism here to establish care.  Patient previously followed with Dr. Yeny Chaves, but patient requesting if his PCP for better patient/physician relationship.    Hypothyroidism: Patient recently diagnosed with hyperthyroidism.  2019 labs: TSH 0.010, free T4 1.60.  Patient's previous PCP prescribed methimazole 5 mg 3 times daily.  Also refer patient to endocrinology.  Patient has endocrinology appointment on 2019 with Dr. Mancuso.  Presently patient reports increased fatigue over the past several months.      Anxiety, increased stress: She states that she has been undergoing a lot of stress over the past year.  She reports her mother  of pancreatic cancer in 2018.  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  Patient reports that her PCP started her on clonazepam to help her with her nighttime restlessness and difficulty sleeping.  Denies melena/hematochezia, SOB.  Now reports she sleeps well.     Multifocal PVCs: Patient reports that she reported to the ED about 2 months ago because of palpitations.  Since then she has had a Holter monitor and an echocardiogram.  Holter monitor showed multifocal PVCs.  She followed up with Dr. Moore, cardiology, for this.  She was instructed to immediately call cardiology if she begins to have symptoms of presyncope or syncopal episodes.  She is to follow-up with cardiology yearly for her multifocal PVCs.  Her echocardiogram showed borderline prolapse.  Per cardiology, Dr. Moore, patient is to follow-up with them every 5 years for this.  Patient initially started on propranolol 10 mg 3 times daily for her multifocal PVCs by cardiology.  She continues to feel her palpitations  and so her propranolol was increased to 20 mg 3 times daily by cardiology.  Now reports only minimally feeling palpitations.  Denies episodes of presyncope or syncope, chest pain, SOB, abd pain.        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     • VITAMIN A PO Take  by mouth.     • Magnesium 250 MG Tab Take  by mouth.     • VITAMIN E PO Take  by mouth.     • Ascorbic Acid (VITAMIN C) 1000 MG Tab Take  by mouth.     • cyanocobalamin (VITAMIN B-12) 500 MCG Tab Take 500 mcg by mouth every day.     • busPIRone (BUSPAR) 15 MG tablet TK 1 T PO BID  5   • cetirizine (ZYRTEC) 10 MG Tab TK 1 T PO QD  9   • clonazePAM (KLONOPIN) 1 MG Tab TK 1 T PO QD  2   • doxepin (SINEQUAN) 10 MG Cap TK 1 C PO QHS  3   • propranolol (INDERAL) 20 MG Tab Take 1 Tab by mouth 3 times a day. 90 Tab 3   • cyclobenzaprine (FLEXERIL) 10 MG Tab      • valacyclovir (VALTREX) 1 GM Tab TK 1 T PO  BID FOR 10 DAYS  1     No current facility-administered medications for this visit.        Allergies as of 06/03/2019   • (No Known Allergies)       Social History     Social History   • Marital status: Single     Spouse name: N/A   • Number of children: N/A   • Years of education: N/A     Occupational History   • Not on file.     Social History Main Topics   • Smoking status: Never Smoker   • Smokeless tobacco: Never Used   • Alcohol use No   • Drug use: No   • Sexual activity: Not on file     Other Topics Concern   • Not on file     Social History Narrative   • No narrative on file       Family History   Problem Relation Age of Onset   • Heart Disease Father         pacemaker, afib   • Heart  Disease Son         likely PFO   • Cancer Mother         bereast bladder and pancrease       Past Surgical History:   Procedure Laterality Date   • APPENDECTOMY CHILD         ROS: As per HPI. All others reviewed and were negative.        /84 (BP Location: Right arm, Patient Position: Sitting)   Pulse 73   Temp 36.9 °C (98.4 °F) (Temporal)   Ht 1.829 m (6')   Wt 82.9 kg (182 lb 12.8 oz)   SpO2 94%   BMI 24.79 kg/m²      Physical Exam  General: .  Alert and oriented, No apparent distress.    Eyes: Pupils equal and reactive. No scleral icterus.    Throat: Clear no erythema or exudates noted.    Neck: Supple. No lymphadenopathy noted. Thyroid not enlarged.    Lungs: Clear to auscultation bilaterally.  No crackles/wheezing.     Cardiovascular: Regular rate and rhythm. No murmurs, rubs or gallops.    Abdomen:  Benign. No rebound or guarding noted.    Extremities: No clubbing, cyanosis, edema.    Skin: Fair skin.  Clear. No rash or suspicious skin lesions noted. Multiple moles, freckles on back.  Multiple tattoos.      Psych: Appropriate mood/affect.     Note: I have reviewed all pertinent labs and diagnostic tests associated with this visit with specific comments listed under the assessment and plan below    Assessment and Plan    1. Multifocal PVCs  - Recent Holter monitor showed multifocal PVCs.  She followed up with Dr. Moore, cardiology.  Instructed to immediately call cardiology if she begins to have symptoms of presyncope or syncopal episodes.  She is to follow-up with cardiology yearly for her multifocal PVCs.    - Recent echocardiogram showed borderline prolapse.  Per cardiology, Dr. Moore, patient is to follow-up with Cardiology every 5 years for this.    - Patient initially started on propranolol 10 mg 3 times daily by Cardiology and recently increased to 20 mg three times daily for her multifocal PVCs.  She reports she has felt improved symptoms and no worsening fatigue since starting  this.  She    2. Hyperthyroidism  - April 2019 labs: TSH 0.010, free T4 1.60.  Continue methimazole 5 mg 3 times daily.    - Patient has an Endocrinology appointment on 6/13/2019 with Dr. Mancuso.    - Repeat TSH/free T4 labs this week prior to appointment.      3. Anxiety  4. Difficulty with sleep  5. Fatigue, unspecified type  - Anxiety has been improving.  No present difficulty sleeping on clonazepam prescribed by previous PCP.  Instructed patient to decrease Clonazepam by half for the purposes of tapering it as it may be contributing to her fatigue.  -H&H labs in April 2019 unremarkable.    - REFERRAL TO PSYCHOLOGY  - REFERRAL TO PSYCHIATRY  - [facility] SED RATE; Future  - CRP HIGH SENSITIVE (CARDIAC); Future  - SIENNA REFLEXIVE PROFILE; Future  - RHEUMATOID ARTHRITIS FACTOR; Future    6. Hypokalemia  -4/2/2019 lab-potassium 3.5.  No signs/symptoms of hypokalemia.  -Repeat BMP.    - Basic Metabolic Panel; Future    7. Healthcare maintenance  -Family history of skin cancer.  Patient with fair skin.  Will refer to dermatology for mole screening.  - REFERRAL TO DERMATOLOGY  - Will need to obtain records from previous PCP.        Followup: Return in about 5 weeks (around 7/8/2019) for Long - follow up - multiple concerns. .    Signed by: Camila Perdomo M.D.

## 2019-06-04 NOTE — TELEPHONE ENCOUNTER
From: Vidya Davies  To: Camila Perdomo M.D.  Sent: 6/3/2019 7:04 PM PDT  Subject: Procedure Question    Hi. I got my labs done today but the people at [facility] said none of the blood draws where for the thyroid. And Today when we met you mentioned having my thyroid tested. So I just wanted to check. Thank you.

## 2019-06-27 ENCOUNTER — OFFICE VISIT (OUTPATIENT)
Dept: INTERNAL MEDICINE | Facility: MEDICAL CENTER | Age: 39
End: 2019-06-27
Payer: MEDICAID

## 2019-06-27 VITALS
OXYGEN SATURATION: 99 % | HEIGHT: 71 IN | TEMPERATURE: 97.8 F | DIASTOLIC BLOOD PRESSURE: 69 MMHG | HEART RATE: 69 BPM | SYSTOLIC BLOOD PRESSURE: 110 MMHG | WEIGHT: 181.4 LBS | BODY MASS INDEX: 25.4 KG/M2

## 2019-06-27 DIAGNOSIS — E04.9 GOITER: ICD-10-CM

## 2019-06-27 DIAGNOSIS — E05.00 GRAVES' DISEASE: ICD-10-CM

## 2019-06-27 DIAGNOSIS — R41.840 POOR CONCENTRATION: ICD-10-CM

## 2019-06-27 DIAGNOSIS — G47.00 INSOMNIA, UNSPECIFIED TYPE: ICD-10-CM

## 2019-06-27 DIAGNOSIS — F41.9 ANXIETY: ICD-10-CM

## 2019-06-27 DIAGNOSIS — E05.90 HYPERTHYROIDISM: Chronic | ICD-10-CM

## 2019-06-27 PROCEDURE — 99213 OFFICE O/P EST LOW 20 MIN: CPT | Mod: GE | Performed by: INTERNAL MEDICINE

## 2019-06-27 RX ORDER — TRAZODONE HYDROCHLORIDE 50 MG/1
50 TABLET ORAL
Qty: 30 TAB | Refills: 2 | Status: SHIPPED | OUTPATIENT
Start: 2019-06-27

## 2019-06-27 RX ORDER — THIAMINE MONONITRATE (VIT B1) 100 MG
100 TABLET ORAL DAILY
COMMUNITY
End: 2019-11-11

## 2019-06-27 RX ORDER — LORATADINE 10 MG/1
10 TABLET ORAL DAILY
COMMUNITY

## 2019-06-27 ASSESSMENT — PAIN SCALES - GENERAL: PAINLEVEL: 4=SLIGHT-MODERATE PAIN

## 2019-06-27 NOTE — PATIENT INSTRUCTIONS
Please start the new medication (Trazodone).   Please follow-up with your PCP (Camila Perdomo M.D.) in about 1 month.    Please follow-up with scheduling for Both Psychology and Psychiatry:  [facility]  Phone:[phone]

## 2019-06-27 NOTE — PROGRESS NOTES
"Established Patient (of office / [facility])  (PCP - Camila Perdomo M.D.)    Chief Complaint   Patient presents with   • Thyroid Problem     medications, anxiety/sleep     HPI:  Vidya Davies is a 38 y.o. female here for follow-up.     Hyperthyroidism  States she was initially diagnosed with thyroid condition back in March.  She is also had problems with PVCs/heart rate, due to this issue.  She has been seen by cardiology, with Holter monitor, and had her propranolol increased to 20, 3 times daily.    She has also previously been referred to endocrinology.  She has since followed up with Dr. Mancuso.  He had noted a mild goiter, and diagnosed Graves' disease/hyperthyroidism.  She had already been on methimazole, from her prior PCP, and notes continued.      Anxiety, insomnia, and mental-fog  However, patient notes that she still continues to be anxious, and also has trouble sleeping.  She feels these are separate issues.  She states that she has trouble sleeping, but it is not from feeling anxious (at that time).  She also notes that during the day, she feels sometimes confused (or has trouble finding right words); but feels it is unrelated to trouble sleeping, or being tired.  She has not noticed any difference in this, from the morning to the evening.    She notes that she was previously on clonazepam, but this was tapered off by her new PCP.  She had also been on doxepin, but felt that it stopped working; therefore, the patient stopped taking it, on her own.  She also notes that she was previously (a while back) given buspirone, that she thought may have had some effect; but was uncertain because she was on Xanax, as well, at that time.  And, she notes that she had previously been given some \"depression\" medications, that she does not recall the name of, but did not react well to.  She does note that Paxil was 1 of these medications (but does not recall the names of the others).  She states that when she " used Paxil before, she had a worsening of her anxiety.      MARIE-7 scale (Generalized Anxiety Disorder)   1.  Feeling nervous, anxious, or on edge           3  2.  Not being able to stop or control worrying     1  3.  Worrying too much about different things     2  4.  Trouble relaxing                                             3  5.  Being so restless that it's hard to sit still       1  6.  Becoming easily annoyed or irritable            2  7.  Feeling afraid as if something awful might happen     0  If you checked off any problems, how difficult have these made it for you to do your work, take care of things at home, or get along with other people?   Not difficult at all __________   Somewhat difficult ___X______   Very difficult _____________   Extremely difficult _________   Total Score =   12      Review of Symptoms  GEN/CONST:   Denies fever, fatigue, weakness, or significant weight change.   H/E:     Denies  eye pain.  + occasional blurry vision (will f/u with opthalmology)  ENT:   Denies sinus pain, sore throat, ear pain, difficulty hearing, or tinnitus.  CARDIO:   Denies  palpitations, orthopnea, or edema.  + occasional chest pain, (will f/u with cardiology)  RESP:   Denies shortness of breath, wheezing, or coughing.  GI:    Denies nausea, vomiting, diarrhea, constipation, or abdominal pain.   :   Denies dysuria, hematuria, hesitancy, or urgency.  HEME:  Denies easy bruising, bleeding, or problem with clots.   ALLG:  Denies allergies, asthma, or hives.    MSK:  Denies weakness, edema, joint pains or swellings, or muscle aches.   SKIN:  Denies rashes, itches, or sores.   NEURO:  Denies numbness or tingling, altered sensation, or focal weakness.    ENDO:  Denies cold intolerance, polyuria, or polydipsia.  + often feels hot.   PSYCH:  Denies anxiety, depression, substance abuse, or insomnia.       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     Social History Main Topics   • Smoking status: Never Smoker   • Smokeless tobacco: Never Used   • Alcohol use 0.6 oz/week     1 Cans of beer per week      Comment: Social    • Drug use: No   • Sexual activity: Not on file     Other Topics Concern   • Not on file     Social History Narrative   • No narrative on file       Current Outpatient Prescriptions   Medication Sig Dispense Refill   • loratadine (CLARITIN) 10 MG Tab Take 10 mg by mouth every day.     • thiamine (THIAMINE) 100 MG Tab Take 100 mg by mouth every day.     • methimazole (TAPAZOLE) 5 MG Tab Take 1 Tab by mouth 3 times a day. 90 Tab 0   • VITAMIN A PO Take  by mouth.     • Magnesium 250 MG Tab Take  by mouth.     • VITAMIN E PO Take  by mouth.     • propranolol (INDERAL) 20 MG Tab Take 1 Tab by mouth 3 times a day. 90 Tab 3     No current facility-administered medications for this visit.        Allergies   Allergen Reactions   • Paroxetine      Anxiety increased.          Physical Exam  /69 (BP Location: Left arm, Patient Position: Sitting, BP Cuff Size: Adult)   Pulse 69   Temp 36.6 °C (97.8 °F) (Temporal)   Ht 1.81 m (5' 11.26\")   Wt 82.3 kg (181 lb 6.4 oz)   SpO2 99%   BMI 25.12 kg/m²   General:  Alert and oriented, No apparent distress.  HEENT:   PERRLA, EOMI, No icterus, No erythema or exudates.    Lungs: Clear to auscultation bilaterally.  No wheezes or rales.  Cardiovascular: Regular rate and rhythm.  No murmurs, rubs or gallops.  Abdomen:  Soft.  Non-distended.  Non-tender.  " "  Normal bowel sounds.  No rebound or guarding noted.   Extremities: No pedal edema.  Good general motion of all 4 extremities.   Neurological:  Motor function and sensation grossly intact and symmetrical.   Psychological: Appears to have normal mood and affect.      Labs:  Brings in outside labs (will scan into media).   TSH-0.28, T3-96, T4-1.2  (Improved, from prior labs).      Assessment & Plan    1. Insomnia   2. Anxiety  3. Poor concentration  Patient describes these as separate conditions, but may be interrelated.  She has previously tried and failed clonazepam, doxepin, buspirone, and Paxil, as well as other \"depression\" medications.  Will try trazodone, to help with insomnia, as it can also be helpful with anxiety.  Hopefully, if these 2 issues are improved, her concentration may improve.  Patient is also previously been referred to psychiatry and psychology.  However, she has not followed up, as she was uncertain of the number to schedule at.  This was reprinted out for her, and given her in the \"wrap-up\" instructions.  - traZODone (DESYREL) 50 MG Tab; Take 1 Tab by mouth every bedtime.  Dispense: 30 Tab; Refill: 2    4. Hyperthyroidism  5. Graves' disease  6. Goiter  Patient is being followed by endocrinology, and is currently on methimazole.  She also follows with cardiology (and is on propanolol), for past heart rate concerns and PVCs.  We will allow the specialists to further manage those issues.      Patient should follow-up with her PCP (Camila Perdomo M.D.) in a few weeks, to monitor the condition of her anxiety/insomnia, and consider medication changes, if needed, while waiting for psychiatry (as well as for further, routine follow-up).      Instructions given to the patient:  Please start the new medication (Trazodone).   Please follow-up with your PCP (Camila Perdomo M.D.) in about 1 month.    Please follow-up with scheduling for Both Psychology and Psychiatry:  [facility] " [facility]  Phone:[phone]      A computerized dictation system may have been used for this note.    Despite review, there may be some spelling or grammatical errors.    Pawan Cuadra M.D.  6/27/2019   Attending:  Kimmy Lawrence M.D.

## 2019-06-28 ENCOUNTER — OFFICE VISIT (OUTPATIENT)
Dept: CARDIOLOGY | Facility: MEDICAL CENTER | Age: 39
End: 2019-06-28
Payer: MEDICAID

## 2019-06-28 VITALS
WEIGHT: 182 LBS | SYSTOLIC BLOOD PRESSURE: 112 MMHG | HEIGHT: 72 IN | HEART RATE: 66 BPM | BODY MASS INDEX: 24.65 KG/M2 | OXYGEN SATURATION: 100 % | DIASTOLIC BLOOD PRESSURE: 74 MMHG

## 2019-06-28 DIAGNOSIS — E05.00 GRAVES' DISEASE: ICD-10-CM

## 2019-06-28 DIAGNOSIS — I49.3 MULTIFOCAL PVCS: Chronic | ICD-10-CM

## 2019-06-28 PROCEDURE — 99214 OFFICE O/P EST MOD 30 MIN: CPT | Performed by: NURSE PRACTITIONER

## 2019-06-28 RX ORDER — MAGNESIUM OXIDE 400 MG/1
400 TABLET ORAL 2 TIMES DAILY
Qty: 60 TAB | Refills: 3 | Status: SHIPPED | OUTPATIENT
Start: 2019-06-28 | End: 2019-10-22 | Stop reason: SDUPTHER

## 2019-06-28 ASSESSMENT — ENCOUNTER SYMPTOMS
WHEEZING: 0
CONSTIPATION: 1
SHORTNESS OF BREATH: 1
DIARRHEA: 1
DIZZINESS: 0
HEADACHES: 1
WEAKNESS: 0
MEMORY LOSS: 1
BLURRED VISION: 1
PALPITATIONS: 0
DOUBLE VISION: 0
LOSS OF CONSCIOUSNESS: 0
FALLS: 0
COUGH: 0
INSOMNIA: 1
BACK PAIN: 1
ORTHOPNEA: 0
DIAPHORESIS: 1
NERVOUS/ANXIOUS: 1
FOCAL WEAKNESS: 0
TINGLING: 1

## 2019-06-28 NOTE — PROGRESS NOTES
Chief Complaint   Patient presents with   • Chest Pain     follow up       Subjective:   Vidya Davies is a 38 y.o. female who presents today for palpitations and chest pain.    She is patient of Dr. Moore, history of multifocal PVCs and hyperthyroidism.    Patient was last seen 2 months ago at the time was having palpitation and chest pain.  Holter monitor showed multiple PVCs and was placed on propanolol 20 mg 3 times daily and echocardiogram showed mild mitral regurgitation with leaflet prolapse.    Patient palpitation has improved since being on propanolol.  She occasionally does have chest pain and palpitation at rest and during stressful event.  Lasting couple of seconds to minutes and resolve.    She has significant amount of stress with her business and home life.  In addition she consumes 1 cup of coffee and iced tea throughout all morning.       She was recently diagnosed with Graves' disease and was placed on methimazole 5mg.     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     Social History Main Topics   • Smoking status: Never Smoker   • Smokeless tobacco: Never Used   • Alcohol use 0.6 oz/week     1 Cans of beer per week      Comment: Social    • Drug use: No   • Sexual activity: Not on file     Other Topics Concern   • Not on file     Social History Narrative   • No narrative on file     Allergies   Allergen Reactions   • Paroxetine      Anxiety increased.      Outpatient Encounter  "Prescriptions as of 6/28/2019   Medication Sig Dispense Refill   • magnesium oxide (MAG-OX) 400 MG Tab Take 1 Tab by mouth 2 times a day. 60 Tab 3   • loratadine (CLARITIN) 10 MG Tab Take 10 mg by mouth every day.     • thiamine (THIAMINE) 100 MG Tab Take 100 mg by mouth every day.     • traZODone (DESYREL) 50 MG Tab Take 1 Tab by mouth every bedtime. 30 Tab 2   • methimazole (TAPAZOLE) 5 MG Tab Take 1 Tab by mouth 3 times a day. 90 Tab 0   • VITAMIN A PO Take  by mouth.     • VITAMIN E PO Take  by mouth.     • propranolol (INDERAL) 20 MG Tab Take 1 Tab by mouth 3 times a day. 90 Tab 3   • [DISCONTINUED] Magnesium 250 MG Tab Take  by mouth.       No facility-administered encounter medications on file as of 6/28/2019.      Review of Systems   Constitutional: Positive for diaphoresis and malaise/fatigue.   Eyes: Positive for blurred vision. Negative for double vision.   Respiratory: Positive for shortness of breath. Negative for cough and wheezing.    Cardiovascular: Positive for chest pain and leg swelling. Negative for palpitations and orthopnea.   Gastrointestinal: Positive for constipation and diarrhea.   Musculoskeletal: Positive for back pain and joint pain. Negative for falls.   Neurological: Positive for tingling and headaches. Negative for dizziness, focal weakness, loss of consciousness and weakness.   Psychiatric/Behavioral: Positive for memory loss. The patient is nervous/anxious and has insomnia.    All other systems reviewed and are negative.       Objective:   /74 (BP Location: Left arm, Patient Position: Sitting)   Pulse 66   Ht 1.816 m (5' 11.5\")   Wt 82.6 kg (182 lb)   SpO2 100%   BMI 25.03 kg/m²     Physical Exam   Constitutional: She is oriented to person, place, and time. She appears well-developed and well-nourished. No distress.   HENT:   Head: Normocephalic and atraumatic.   Eyes: Pupils are equal, round, and reactive to light.   Neck: No JVD present.   Cardiovascular: Normal rate, " regular rhythm and normal heart sounds.   Extrasystoles are present.   No murmur heard.  Pulmonary/Chest: Effort normal and breath sounds normal.   Abdominal: Soft. Bowel sounds are normal. She exhibits no distension.   Musculoskeletal: She exhibits no edema.   Neurological: She is alert and oriented to person, place, and time.   Skin: Skin is warm and dry. She is not diaphoretic.   Psychiatric: She has a normal mood and affect. Her behavior is normal. Judgment and thought content normal.       Echocardiography Laboratory  5/6/19  No prior study is available for comparison.   Left ventricular ejection fraction is visually estimated to be 60%.  Normal diastolic function.  Normal right ventricular systolic function.  Borderline prolapse of the posterior and anterior mitral leaflet was   present.  Mild mitral regurgitation.    Holter monitor   5/3/19  normal sinus rhythm   frequent ventricular ectopy 2.9%   frequent ventricular ectopy including NSVT (up to 4 beats)   rare atrial ectopy including short runs of pSVT up to 16 seconds   symptoms did not correlate with arrhythmia   .  Assessment:     1. Multifocal PVCs  magnesium oxide (MAG-OX) 400 MG Tab   2. Graves' disease         Medical Decision Making:  Today's Assessment / Status / Plan:     1. Multifocal PVCs:  - continue propanolol 20mg TID, due to occasional bradycardia we will hold off on increasing the medications    - recommend low caffeine intake a day to abstain   - recommend light walks every day   - start magnesium oxide 400mg BID     2. Graves disease:  - follow up with endocrinologist     Follow up in 5 months with Dr. Moore, sooner as needed.     Collaborating Provider: Dr. Hodges       2.

## 2019-08-05 DIAGNOSIS — I49.3 MULTIFOCAL PVCS: Primary | ICD-10-CM

## 2019-08-06 RX ORDER — PROPRANOLOL HYDROCHLORIDE 20 MG/1
TABLET ORAL
Qty: 270 TAB | Refills: 3 | Status: SHIPPED | OUTPATIENT
Start: 2019-08-06

## 2019-10-22 ENCOUNTER — TELEPHONE (OUTPATIENT)
Dept: CARDIOLOGY | Facility: MEDICAL CENTER | Age: 39
End: 2019-10-22

## 2019-10-22 DIAGNOSIS — I49.3 PVC (PREMATURE VENTRICULAR CONTRACTION): ICD-10-CM

## 2019-10-22 DIAGNOSIS — I49.3 MULTIFOCAL PVCS: Chronic | ICD-10-CM

## 2019-11-05 DIAGNOSIS — I49.3 PVC (PREMATURE VENTRICULAR CONTRACTION): ICD-10-CM

## 2019-11-11 ENCOUNTER — OFFICE VISIT (OUTPATIENT)
Dept: CARDIOLOGY | Facility: MEDICAL CENTER | Age: 39
End: 2019-11-11
Payer: MEDICAID

## 2019-11-11 VITALS
BODY MASS INDEX: 24.3 KG/M2 | OXYGEN SATURATION: 98 % | DIASTOLIC BLOOD PRESSURE: 72 MMHG | WEIGHT: 179.4 LBS | HEIGHT: 72 IN | HEART RATE: 69 BPM | SYSTOLIC BLOOD PRESSURE: 118 MMHG

## 2019-11-11 DIAGNOSIS — I49.3 MULTIFOCAL PVCS: Chronic | ICD-10-CM

## 2019-11-11 DIAGNOSIS — I34.1 MITRAL VALVE PROLAPSE: Chronic | ICD-10-CM

## 2019-11-11 PROCEDURE — 99213 OFFICE O/P EST LOW 20 MIN: CPT | Performed by: INTERNAL MEDICINE

## 2019-11-11 RX ORDER — ZOLPIDEM TARTRATE 5 MG/1
10 TABLET ORAL NIGHTLY PRN
COMMUNITY

## 2019-11-11 RX ORDER — BUSPIRONE HYDROCHLORIDE 10 MG/1
15 TABLET ORAL 3 TIMES DAILY
COMMUNITY

## 2019-11-11 ASSESSMENT — ENCOUNTER SYMPTOMS
COUGH: 0
NAUSEA: 0
FEVER: 0
CLAUDICATION: 0
BLURRED VISION: 0
FOCAL WEAKNESS: 0
CHILLS: 0
ABDOMINAL PAIN: 0
DIZZINESS: 0
BRUISES/BLEEDS EASILY: 0
SHORTNESS OF BREATH: 0
WEAKNESS: 0
SORE THROAT: 0
FALLS: 0
PND: 0
PALPITATIONS: 1

## 2019-11-11 NOTE — LETTER
[facility]   [address]  [place] [address]  Phone: [phone]  Fax: [phone]              Vidya Davies  1980    Encounter Date: 11/11/2019    Kenny Moore M.D.          PROGRESS NOTE:  Chief Complaint   Patient presents with   • Premature Ventricular Contractions (PVCs)       Subjective:   Vidya Davies is a 39 y.o. 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     Past Surgical History:   Procedure Laterality Date   • APPENDECTOMY CHILD       Family History   Problem Relation Age of Onset   • Heart Disease Father         pacemaker, afib   • Heart Disease Son         likely PFO   • Cancer Mother         bereast bladder and pancrease     Social History     Socioeconomic History   • Marital status: Single     Spouse name: Not on file   • Number of children: Not on file   • Years of education: Not on file   • Highest education level: Not on file   Occupational History   • Not on file   Social Needs   • Financial resource strain: Not on file   • Food insecurity:     Worry: Not on file     Inability: Not on file   • Transportation needs:     Medical: Not on file     Non-medical: Not on file   Tobacco Use   • Smoking status: Never Smoker   • Smokeless tobacco: Never Used   Substance and Sexual Activity   • Alcohol use: Yes     Alcohol/week: 0.6 oz     Types: 1 Cans of beer per week     Comment: Social    • Drug use: No   • Sexual activity: Not on file   Lifestyle   • Physical activity:     Days per week: Not on file     Minutes per session: Not on file   • Stress: Not on file   Relationships   •  Social connections:     Talks on phone: Not on file     Gets together: Not on file     Attends Hoahaoism service: Not on file     Active member of club or organization: Not on file     Attends meetings of clubs or organizations: Not on file     Relationship status: Not on file   • Intimate partner violence:     Fear of current or ex partner: Not on file     Emotionally abused: Not on file     Physically abused: Not on file     Forced sexual activity: Not on file   Other Topics Concern   • Not on file   Social History Narrative   • Not on file     Allergies   Allergen Reactions   • Trazodone    • Paroxetine      Anxiety increased.      Outpatient Encounter Medications as of 11/11/2019   Medication Sig Dispense Refill   • busPIRone (BUSPAR) 10 MG Tab tablet Take 15 mg by mouth 3 times a day.     • zolpidem (AMBIEN) 5 MG Tab Take 10 mg by mouth at bedtime as needed for Sleep.     • MAGNESIUM-OXIDE 400 (241.3 Mg) MG Tab tablet TAKE 1 TABLET BY MOUTH TWICE DAILY (Patient taking differently: 250 mg.) 90 Tab 3   • propranolol (INDERAL) 20 MG Tab TAKE 1 TABLET BY MOUTH THREE TIMES DAILY 270 Tab 3   • loratadine (CLARITIN) 10 MG Tab Take 10 mg by mouth every day.     • methimazole (TAPAZOLE) 5 MG Tab Take 1 Tab by mouth 3 times a day. (Patient taking differently: Take 5 mg by mouth.) 90 Tab 0   • traZODone (DESYREL) 50 MG Tab Take 1 Tab by mouth every bedtime. (Patient not taking: Reported on 11/11/2019) 30 Tab 2   • [DISCONTINUED] thiamine (THIAMINE) 100 MG Tab Take 100 mg by mouth every day.     • [DISCONTINUED] VITAMIN A PO Take  by mouth.     • [DISCONTINUED] VITAMIN E PO Take  by mouth.       No facility-administered encounter medications on file as of 11/11/2019.      Review of Systems   Constitutional: Negative for chills and fever.   HENT: Negative for sore throat.    Eyes: Negative for blurred vision.   Respiratory: Negative for cough and shortness of breath.    Cardiovascular: Positive for palpitations. Negative  "for chest pain, claudication, leg swelling and PND.   Gastrointestinal: Negative for abdominal pain and nausea.   Musculoskeletal: Negative for falls and joint pain.   Skin: Negative for rash.   Neurological: Negative for dizziness, focal weakness and weakness.   Endo/Heme/Allergies: Does not bruise/bleed easily.        Objective:   /72 (BP Location: Left arm, Patient Position: Sitting, BP Cuff Size: Adult)   Pulse 69   Ht 1.816 m (5' 11.5\")   Wt 81.4 kg (179 lb 6.4 oz)   SpO2 98%   BMI 24.67 kg/m²      Physical Exam   Constitutional: No distress.   HENT:   Mouth/Throat: Oropharynx is clear and moist. No oropharyngeal exudate.   Eyes: No scleral icterus.   Neck: No JVD present.   Cardiovascular: Normal rate and normal heart sounds. Exam reveals no gallop and no friction rub.   No murmur heard.  Pulmonary/Chest: No respiratory distress. She has no wheezes. She has no rales.   Abdominal: Soft. Bowel sounds are normal.   Musculoskeletal:         General: No edema.   Neurological: She is alert.   Skin: No rash noted. She is not diaphoretic.   Psychiatric: She has a normal mood and affect.     We reviewed in person the most recent labs    We reviewed the report and tracing where event monitor which shows occasional PVCs 3%   Assessment:     1. Multifocal PVCs     2. Mitral valve prolapse         Medical Decision Making:  Today's Assessment / Status / Plan:     It was my pleasure to meet with Ms. Davies.    Treatment of her hyperthyroidism should improve her overall PVC burden she does have mitral valve prolapse so should have occasional reinvestigation this every 5 to 10 years    I will see Ms. Davies back in 5 year time and encouraged her to follow up with us over the phone or electronically using my MyChart as issues arise.    It is my pleasure to participate in the care of Ms. Davies.  Please do not hesitate to contact me with questions or concerns.    Kenny Moore MD PhD " [facility]  Cardiologist [facility]    Please note that this dictation was created using voice recognition software. I have worked with consultants from the vendor as well as technical experts from [facility] to optimize the interface. I have made every reasonable attempt to correct obvious errors, but I expect that there are errors of grammar and possibly content I did not discover before finalizing the note.         Camila Perdomo M.D.  [address] [place] [address]  [facility] [address]  VIA Facsimile: [phone]     Rebecca Iverson M.D.  [address]  [place] [address]  VIA Facsimile: [phone]

## 2019-11-11 NOTE — PROGRESS NOTES
Chief Complaint   Patient presents with   • Premature Ventricular Contractions (PVCs)       Subjective:   Vidya Davies is a 39 y.o. 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     Past Surgical History:   Procedure Laterality Date   • APPENDECTOMY CHILD       Family History   Problem Relation Age of Onset   • Heart Disease Father         pacemaker, afib   • Heart Disease Son         likely PFO   • Cancer Mother         bereast bladder and pancrease     Social History     Socioeconomic History   • Marital status: Single     Spouse name: Not on file   • Number of children: Not on file   • Years of education: Not on file   • Highest education level: Not on file   Occupational History   • Not on file   Social Needs   • Financial resource strain: Not on file   • Food insecurity:     Worry: Not on file     Inability: Not on file   • Transportation needs:     Medical: Not on file     Non-medical: Not on file   Tobacco Use   • Smoking status: Never Smoker   • Smokeless tobacco: Never Used   Substance and Sexual Activity   • Alcohol use: Yes     Alcohol/week: 0.6 oz     Types: 1 Cans of beer per week     Comment: Social    • Drug use: No   • Sexual activity: Not on file   Lifestyle   • Physical activity:     Days per week: Not on file     Minutes per session: Not on file   • Stress: Not on file   Relationships   • Social connections:     Talks on phone: Not on file     Gets together: Not on file     Attends [employer] service: Not on file     Active member of club or organization: Not on file     Attends meetings of clubs or organizations: Not on file     Relationship status: Not on file    • Intimate partner violence:     Fear of current or ex partner: Not on file     Emotionally abused: Not on file     Physically abused: Not on file     Forced sexual activity: Not on file   Other Topics Concern   • Not on file   Social History Narrative   • Not on file     Allergies   Allergen Reactions   • Trazodone    • Paroxetine      Anxiety increased.      Outpatient Encounter Medications as of 11/11/2019   Medication Sig Dispense Refill   • busPIRone (BUSPAR) 10 MG Tab tablet Take 15 mg by mouth 3 times a day.     • zolpidem (AMBIEN) 5 MG Tab Take 10 mg by mouth at bedtime as needed for Sleep.     • MAGNESIUM-OXIDE 400 (241.3 Mg) MG Tab tablet TAKE 1 TABLET BY MOUTH TWICE DAILY (Patient taking differently: 250 mg.) 90 Tab 3   • propranolol (INDERAL) 20 MG Tab TAKE 1 TABLET BY MOUTH THREE TIMES DAILY 270 Tab 3   • loratadine (CLARITIN) 10 MG Tab Take 10 mg by mouth every day.     • methimazole (TAPAZOLE) 5 MG Tab Take 1 Tab by mouth 3 times a day. (Patient taking differently: Take 5 mg by mouth.) 90 Tab 0   • traZODone (DESYREL) 50 MG Tab Take 1 Tab by mouth every bedtime. (Patient not taking: Reported on 11/11/2019) 30 Tab 2   • [DISCONTINUED] thiamine (THIAMINE) 100 MG Tab Take 100 mg by mouth every day.     • [DISCONTINUED] VITAMIN A PO Take  by mouth.     • [DISCONTINUED] VITAMIN E PO Take  by mouth.       No facility-administered encounter medications on file as of 11/11/2019.      Review of Systems   Constitutional: Negative for chills and fever.   HENT: Negative for sore throat.    Eyes: Negative for blurred vision.   Respiratory: Negative for cough and shortness of breath.    Cardiovascular: Positive for palpitations. Negative for chest pain, claudication, leg swelling and PND.   Gastrointestinal: Negative for abdominal pain and nausea.   Musculoskeletal: Negative for falls and joint pain.   Skin: Negative for rash.   Neurological: Negative for dizziness, focal weakness and weakness.  "  Endo/Heme/Allergies: Does not bruise/bleed easily.        Objective:   /72 (BP Location: Left arm, Patient Position: Sitting, BP Cuff Size: Adult)   Pulse 69   Ht 1.816 m (5' 11.5\")   Wt 81.4 kg (179 lb 6.4 oz)   SpO2 98%   BMI 24.67 kg/m²     Physical Exam   Constitutional: No distress.   HENT:   Mouth/Throat: Oropharynx is clear and moist. No oropharyngeal exudate.   Eyes: No scleral icterus.   Neck: No JVD present.   Cardiovascular: Normal rate and normal heart sounds. Exam reveals no gallop and no friction rub.   No murmur heard.  Pulmonary/Chest: No respiratory distress. She has no wheezes. She has no rales.   Abdominal: Soft. Bowel sounds are normal.   Musculoskeletal:         General: No edema.   Neurological: She is alert.   Skin: No rash noted. She is not diaphoretic.   Psychiatric: She has a normal mood and affect.     We reviewed in person the most recent labs    We reviewed the report and tracing where event monitor which shows occasional PVCs 3%   Assessment:     1. Multifocal PVCs     2. Mitral valve prolapse         Medical Decision Making:  Today's Assessment / Status / Plan:     It was my pleasure to meet with Ms. Davies.    Treatment of her hyperthyroidism should improve her overall PVC burden she does have mitral valve prolapse so should have occasional reinvestigation this every 5 to 10 years    I will see Ms. Davies back in 5 year time and encouraged her to follow up with us over the phone or electronically using my MyChart as issues arise.    It is my pleasure to participate in the care of Ms. Davies.  Please do not hesitate to contact me with questions or concerns.    Kenny Moore MD PhD FAC  Cardiologist [facility]    Please note that this dictation was created using voice recognition software. I have worked with consultants from the vendor as well as technical experts from [facility] to optimize the interface. I have made " every reasonable attempt to correct obvious errors, but I expect that there are errors of grammar and possibly content I did not discover before finalizing the note.

## 2023-07-24 ENCOUNTER — APPOINTMENT (RX ONLY)
Dept: URBAN - METROPOLITAN AREA CLINIC 4 | Facility: CLINIC | Age: 43
Setting detail: DERMATOLOGY
End: 2023-07-24

## 2023-07-24 DIAGNOSIS — D18.0 HEMANGIOMA: ICD-10-CM

## 2023-07-24 DIAGNOSIS — Q828 OTHER SPECIFIED ANOMALIES OF SKIN: ICD-10-CM

## 2023-07-24 DIAGNOSIS — Z71.89 OTHER SPECIFIED COUNSELING: ICD-10-CM

## 2023-07-24 DIAGNOSIS — L30.9 DERMATITIS, UNSPECIFIED: ICD-10-CM

## 2023-07-24 DIAGNOSIS — L81.4 OTHER MELANIN HYPERPIGMENTATION: ICD-10-CM

## 2023-07-24 DIAGNOSIS — L82.1 OTHER SEBORRHEIC KERATOSIS: ICD-10-CM

## 2023-07-24 DIAGNOSIS — D22 MELANOCYTIC NEVI: ICD-10-CM

## 2023-07-24 DIAGNOSIS — Q826 OTHER SPECIFIED ANOMALIES OF SKIN: ICD-10-CM

## 2023-07-24 DIAGNOSIS — Q819 OTHER SPECIFIED ANOMALIES OF SKIN: ICD-10-CM

## 2023-07-24 PROBLEM — D48.5 NEOPLASM OF UNCERTAIN BEHAVIOR OF SKIN: Status: ACTIVE | Noted: 2023-07-24

## 2023-07-24 PROBLEM — D18.01 HEMANGIOMA OF SKIN AND SUBCUTANEOUS TISSUE: Status: ACTIVE | Noted: 2023-07-24

## 2023-07-24 PROBLEM — D22.5 MELANOCYTIC NEVI OF TRUNK: Status: ACTIVE | Noted: 2023-07-24

## 2023-07-24 PROBLEM — L85.8 OTHER SPECIFIED EPIDERMAL THICKENING: Status: ACTIVE | Noted: 2023-07-24

## 2023-07-24 PROCEDURE — 99204 OFFICE O/P NEW MOD 45 MIN: CPT | Mod: 25

## 2023-07-24 PROCEDURE — ? DIAGNOSIS COMMENT

## 2023-07-24 PROCEDURE — 11103 TANGNTL BX SKIN EA SEP/ADDL: CPT

## 2023-07-24 PROCEDURE — ? PRESCRIPTION

## 2023-07-24 PROCEDURE — ? COUNSELING

## 2023-07-24 PROCEDURE — ? BIOPSY BY SHAVE METHOD

## 2023-07-24 PROCEDURE — 11102 TANGNTL BX SKIN SINGLE LES: CPT

## 2023-07-24 RX ORDER — HYDROCORTISONE 25 MG/G
1 CREAM TOPICAL BID
Qty: 20 | Refills: 2 | Status: ERX | COMMUNITY
Start: 2023-07-24

## 2023-07-24 RX ADMIN — HYDROCORTISONE 1: 25 CREAM TOPICAL at 00:00

## 2023-07-24 ASSESSMENT — LOCATION ZONE DERM
LOCATION ZONE: TRUNK
LOCATION ZONE: ARM
LOCATION ZONE: SCALP
LOCATION ZONE: AXILLAE
LOCATION ZONE: LEG

## 2023-07-24 ASSESSMENT — LOCATION DETAILED DESCRIPTION DERM
LOCATION DETAILED: RIGHT RIB CAGE
LOCATION DETAILED: LEFT DISTAL CALF
LOCATION DETAILED: RIGHT PROXIMAL POSTERIOR UPPER ARM
LOCATION DETAILED: LEFT PROXIMAL POSTERIOR UPPER ARM
LOCATION DETAILED: RIGHT PROXIMAL CALF
LOCATION DETAILED: LEFT AXILLARY VAULT
LOCATION DETAILED: RIGHT CENTRAL POSTAURICULAR SKIN
LOCATION DETAILED: RIGHT MEDIAL UPPER BACK
LOCATION DETAILED: RIGHT DISTAL PRETIBIAL REGION
LOCATION DETAILED: LEFT SUPERIOR MEDIAL UPPER BACK
LOCATION DETAILED: RIGHT AXILLARY VAULT
LOCATION DETAILED: LEFT MEDIAL UPPER BACK

## 2023-07-24 ASSESSMENT — LOCATION SIMPLE DESCRIPTION DERM
LOCATION SIMPLE: RIGHT POSTERIOR UPPER ARM
LOCATION SIMPLE: RIGHT PRETIBIAL REGION
LOCATION SIMPLE: RIGHT AXILLARY VAULT
LOCATION SIMPLE: RIGHT CALF
LOCATION SIMPLE: LEFT UPPER BACK
LOCATION SIMPLE: ABDOMEN
LOCATION SIMPLE: LEFT POSTERIOR UPPER ARM
LOCATION SIMPLE: LEFT CALF
LOCATION SIMPLE: LEFT AXILLARY VAULT
LOCATION SIMPLE: SCALP
LOCATION SIMPLE: RIGHT UPPER BACK

## 2023-07-24 NOTE — PROCEDURE: DIAGNOSIS COMMENT
Render Risk Assessment In Note?: no
Detail Level: Simple
Comment: Favoring contact dermatitis. No evidence today. Recommended fragrance free deodorant, laundry detergent, and body wash. Patient states she has tried all fragrance free products, will prescribe hydrocortisone cream today. If no improvement will recommend patch testing.

## 2023-07-24 NOTE — PROCEDURE: COUNSELING
Detail Level: Generalized
Detail Level: Detailed
Topical Keratolytics Recommendations: Cerave SA
Detail Level: Zone
Detail Level: Simple
Cleanser Recommendations: Gentle cleansers
Moisturizer Recommendations: Cerave cream

## 2023-10-11 ENCOUNTER — APPOINTMENT (RX ONLY)
Dept: URBAN - METROPOLITAN AREA CLINIC 4 | Facility: CLINIC | Age: 43
Setting detail: DERMATOLOGY
End: 2023-10-11

## 2023-10-11 DIAGNOSIS — D22 MELANOCYTIC NEVI: ICD-10-CM

## 2023-10-11 DIAGNOSIS — Z71.89 OTHER SPECIFIED COUNSELING: ICD-10-CM

## 2023-10-11 PROBLEM — D22.9 MELANOCYTIC NEVI, UNSPECIFIED: Status: ACTIVE | Noted: 2023-10-11

## 2023-10-11 PROCEDURE — ? COUNSELING

## 2023-10-11 PROCEDURE — 99212 OFFICE O/P EST SF 10 MIN: CPT

## 2023-10-11 PROCEDURE — ? ADDITIONAL NOTES

## 2023-10-11 NOTE — PROCEDURE: ADDITIONAL NOTES
NOTIFICATION OF INPATIENT ADMISSION   AUTHORIZATION REQUEST   SERVICING FACILITY:   [address] [place] [address]  Tax ID: [id number]  NPI: [id number]   ATTENDING PROVIDER:  Attending Name and NPI#: Juan Francisco Corbin Do [[id number]]  Address: [address] [place] [address]  Phone: [phone]     [phone]  Fax: [phone]  Email: [email or web address]  Contact for approvals/pending authorizations, clinical reviews, and discharge. PHYSICIAN ADVISORY SERVICES:  Medical Necessity Denial & [facility] Review  Phone: [phone]  Fax: [phone]  Email: [email or web address]
Render Risk Assessment In Note?: no
Detail Level: Detailed
Additional Notes: Biopsy site was checked today with no evidence of reoccurrence. Patient was counseled to watch for reoccurrence.

## 2024-08-19 ENCOUNTER — APPOINTMENT (RX ONLY)
Dept: URBAN - METROPOLITAN AREA CLINIC 4 | Facility: CLINIC | Age: 44
Setting detail: DERMATOLOGY
End: 2024-08-19

## 2024-08-19 DIAGNOSIS — Z71.89 OTHER SPECIFIED COUNSELING: ICD-10-CM

## 2024-08-19 DIAGNOSIS — L81.4 OTHER MELANIN HYPERPIGMENTATION: ICD-10-CM

## 2024-08-19 DIAGNOSIS — L82.1 OTHER SEBORRHEIC KERATOSIS: ICD-10-CM

## 2024-08-19 DIAGNOSIS — D18.0 HEMANGIOMA: ICD-10-CM

## 2024-08-19 DIAGNOSIS — D22 MELANOCYTIC NEVI: ICD-10-CM

## 2024-08-19 PROBLEM — D18.01 HEMANGIOMA OF SKIN AND SUBCUTANEOUS TISSUE: Status: ACTIVE | Noted: 2024-08-19

## 2024-08-19 PROBLEM — D22.5 MELANOCYTIC NEVI OF TRUNK: Status: ACTIVE | Noted: 2024-08-19

## 2024-08-19 PROCEDURE — ? COUNSELING

## 2024-08-19 PROCEDURE — 99213 OFFICE O/P EST LOW 20 MIN: CPT

## 2024-08-19 ASSESSMENT — LOCATION SIMPLE DESCRIPTION DERM
LOCATION SIMPLE: CHEST
LOCATION SIMPLE: RIGHT UPPER BACK

## 2024-08-19 ASSESSMENT — LOCATION DETAILED DESCRIPTION DERM
LOCATION DETAILED: RIGHT SUPERIOR MEDIAL UPPER BACK
LOCATION DETAILED: LEFT MEDIAL SUPERIOR CHEST
LOCATION DETAILED: RIGHT MID-UPPER BACK
LOCATION DETAILED: UPPER STERNUM
LOCATION DETAILED: RIGHT INFERIOR UPPER BACK

## 2024-08-19 ASSESSMENT — LOCATION ZONE DERM: LOCATION ZONE: TRUNK

## 2025-08-19 ENCOUNTER — APPOINTMENT (OUTPATIENT)
Dept: URBAN - METROPOLITAN AREA CLINIC 4 | Facility: CLINIC | Age: 45
Setting detail: DERMATOLOGY
End: 2025-08-19

## 2025-08-19 DIAGNOSIS — Z71.89 OTHER SPECIFIED COUNSELING: ICD-10-CM

## 2025-08-19 DIAGNOSIS — L82.1 OTHER SEBORRHEIC KERATOSIS: ICD-10-CM

## 2025-08-19 DIAGNOSIS — D485 NEOPLASM OF UNCERTAIN BEHAVIOR OF SKIN: ICD-10-CM

## 2025-08-19 DIAGNOSIS — D22 MELANOCYTIC NEVI: ICD-10-CM

## 2025-08-19 DIAGNOSIS — D18.0 HEMANGIOMA: ICD-10-CM

## 2025-08-19 DIAGNOSIS — L81.4 OTHER MELANIN HYPERPIGMENTATION: ICD-10-CM

## 2025-08-19 DIAGNOSIS — L72.8 OTHER FOLLICULAR CYSTS OF THE SKIN AND SUBCUTANEOUS TISSUE: ICD-10-CM

## 2025-08-19 PROBLEM — D23.39 OTHER BENIGN NEOPLASM OF SKIN OF OTHER PARTS OF FACE: Status: ACTIVE | Noted: 2025-08-19

## 2025-08-19 PROBLEM — D22.5 MELANOCYTIC NEVI OF TRUNK: Status: ACTIVE | Noted: 2025-08-19

## 2025-08-19 PROBLEM — D18.01 HEMANGIOMA OF SKIN AND SUBCUTANEOUS TISSUE: Status: ACTIVE | Noted: 2025-08-19

## 2025-08-19 PROBLEM — D22.71 MELANOCYTIC NEVI OF RIGHT LOWER LIMB, INCLUDING HIP: Status: ACTIVE | Noted: 2025-08-19

## 2025-08-19 PROBLEM — D48.5 NEOPLASM OF UNCERTAIN BEHAVIOR OF SKIN: Status: ACTIVE | Noted: 2025-08-19

## 2025-08-19 PROCEDURE — ? SUNSCREEN RECOMMENDATIONS

## 2025-08-19 PROCEDURE — ? BIOPSY BY SHAVE METHOD

## 2025-08-19 PROCEDURE — ? BIOPSY BY PUNCH METHOD

## 2025-08-19 PROCEDURE — ? COUNSELING

## 2025-08-19 PROCEDURE — ? OBSERVATION

## 2025-08-19 ASSESSMENT — LOCATION ZONE DERM
LOCATION ZONE: LEG
LOCATION ZONE: ARM
LOCATION ZONE: AXILLAE
LOCATION ZONE: FEET
LOCATION ZONE: TRUNK

## 2025-08-19 ASSESSMENT — LOCATION SIMPLE DESCRIPTION DERM
LOCATION SIMPLE: LEFT SHOULDER
LOCATION SIMPLE: UPPER BACK
LOCATION SIMPLE: RIGHT FOOT
LOCATION SIMPLE: RIGHT AXILLARY VAULT
LOCATION SIMPLE: LEFT POSTERIOR THIGH
LOCATION SIMPLE: RIGHT SHOULDER

## 2025-08-19 ASSESSMENT — LOCATION DETAILED DESCRIPTION DERM
LOCATION DETAILED: INFERIOR THORACIC SPINE
LOCATION DETAILED: RIGHT AXILLARY VAULT
LOCATION DETAILED: LEFT PROXIMAL MEDIAL POSTERIOR THIGH
LOCATION DETAILED: RIGHT MEDIAL DORSAL FOOT
LOCATION DETAILED: LEFT POSTERIOR SHOULDER
LOCATION DETAILED: RIGHT POSTERIOR SHOULDER